# Patient Record
Sex: FEMALE
[De-identification: names, ages, dates, MRNs, and addresses within clinical notes are randomized per-mention and may not be internally consistent; named-entity substitution may affect disease eponyms.]

---

## 2019-06-22 ENCOUNTER — TRANSCRIPTION ENCOUNTER (OUTPATIENT)
Age: 49
End: 2019-06-22

## 2019-07-10 ENCOUNTER — TRANSCRIPTION ENCOUNTER (OUTPATIENT)
Age: 49
End: 2019-07-10

## 2019-11-21 ENCOUNTER — TRANSCRIPTION ENCOUNTER (OUTPATIENT)
Age: 49
End: 2019-11-21

## 2020-01-06 ENCOUNTER — TRANSCRIPTION ENCOUNTER (OUTPATIENT)
Age: 50
End: 2020-01-06

## 2020-03-27 ENCOUNTER — LABORATORY RESULT (OUTPATIENT)
Age: 50
End: 2020-03-27

## 2020-03-27 ENCOUNTER — APPOINTMENT (OUTPATIENT)
Dept: DISASTER EMERGENCY | Facility: CLINIC | Age: 50
End: 2020-03-27
Payer: MEDICAID

## 2020-03-27 VITALS
SYSTOLIC BLOOD PRESSURE: 110 MMHG | RESPIRATION RATE: 18 BRPM | HEART RATE: 92 BPM | OXYGEN SATURATION: 95 % | DIASTOLIC BLOOD PRESSURE: 70 MMHG | TEMPERATURE: 97.5 F

## 2020-03-27 DIAGNOSIS — Z20.828 CONTACT WITH AND (SUSPECTED) EXPOSURE TO OTHER VIRAL COMMUNICABLE DISEASES: ICD-10-CM

## 2020-03-27 PROBLEM — Z00.00 ENCOUNTER FOR PREVENTIVE HEALTH EXAMINATION: Status: ACTIVE | Noted: 2020-03-27

## 2020-03-27 PROCEDURE — 99202 OFFICE O/P NEW SF 15 MIN: CPT

## 2020-03-27 NOTE — REVIEW OF SYSTEMS
[Fever] : fever [Sore Throat] : sore throat [Cough] : cough [FreeTextEntry2] : headache and body aches

## 2020-03-27 NOTE — HISTORY OF PRESENT ILLNESS
[FreeTextEntry8] : 48 y/o female with PMH: COPD, smoking, multiple PE's, tonsillitis, presents for covid 19 testing. She presents with cough, low grade fevers, sore throat, body aches and HA x 1 week. She was in direct contact with a friend who has tested positive for Covid 19

## 2020-03-27 NOTE — PLAN
[FreeTextEntry1] : Performed and ordered Covid-19 nasopharyngeal swab. Counseling provided for self quarantine and supportive care of symptoms, including fever and cough. Written information also provided to patient.\par

## 2020-04-02 PROBLEM — Z20.828 EXPOSURE TO 2019 NOVEL CORONAVIRUS: Status: ACTIVE | Noted: 2020-03-27
